# Patient Record
(demographics unavailable — no encounter records)

---

## 2024-10-07 NOTE — HISTORY OF PRESENT ILLNESS
[FreeTextEntry1] : JOI is a 74 year old male here for a follow up [de-identified] : 75 yo male presents for follow up of HTN, HLD, hepatic steatosis. reports feeling well. Denies fever, chills, cp, palpitations, sob, nvcd.

## 2024-10-07 NOTE — ASSESSMENT
[FreeTextEntry1] : HTN: bp mildly elevated, recommend c/w current regimen including hctz 25 mg po daily, losartan 100 mg po daily, will consider additional medication in 3 months, recommend low salt diet, wt loss, exercise, DASH diet HLD: lipid profile 6/24 reviewed, Recommend low fat diet, wt loss, exercise, nutritional counseling provided, c/w atorvastatin 20 mg po daily, repeat in 3 months Hepatic steatosis: LFTs 6/24 reviewed, Recommend low fat diet, wt loss, exercise, and DASH diet, avoid excess alcohol/tylenol, repeat LFTs in 3 months RTC 3 months

## 2024-10-18 NOTE — ASSESSMENT
[FreeTextEntry1] : HTN: bp elevated, has been elevated at home, start amlodipine 5 mg po daily, c/w hctz 25 mg po daily, c/w losartan 100 mg po daily, recommend low salt diet, low int stretching/exercise, dash diet RTC 4 wks

## 2024-10-18 NOTE — HISTORY OF PRESENT ILLNESS
[FreeTextEntry8] : 73 yo male presents for follow up of elevated bp. he reports that he has been getting elevated blood pressures at home. She was checked by RN at home and has been elevated. Denies fever, chills, cp, palpitations, sob, nvcd.

## 2024-11-07 NOTE — ASSESSMENT
[FreeTextEntry1] : HTN: bp reviewed, c/w amlodipine 5 mg po daily, losartan 100 mg po daily, recommend low salt diet, wt loss, exercise, DASH diet HLD: lipid profile 6/24 reviewed, Recommend low fat diet, wt loss, exercise, nutritional counseling provided, f/u lipid panel ordered, c/w atorvastatin 20 mg po daily Hepatic steatosis: Recommend low fat diet, wt loss, exercise, and DASH diet, avoid excess alcohol/tylenol, f/u LFTs ordered RTC 4 wks

## 2024-11-07 NOTE — HISTORY OF PRESENT ILLNESS
[FreeTextEntry1] : Follow up HLD/HTN  [de-identified] : 73 yo male presents for follow up of HTN, hepatic steatosis, HLD. reports feeling well. Denies fever, chills, cp, palpitations, sob, nvcd.

## 2025-02-07 NOTE — ASSESSMENT
[FreeTextEntry1] : HLD: lipid profile 11/24 reviewed, Recommend low fat diet, wt loss, exercise, nutritional counseling provided, f/u lipid panel ordered, c/w atorvastatin 20 mg po daily HTN: bp reviewed, start amlodipine 10 mg po daily, c/w losartan 100 mg po daily, dc hctz, recommend low salt diet, wt loss, exercise, DASH diet Hepatic steatosis: LFTs 11/24 reviewed, Recommend low fat diet, wt loss, exercise, and DASH diet, avoid excess alcohol/tylenol, f/u LFTs ordered RTC 4 wks

## 2025-02-07 NOTE — HISTORY OF PRESENT ILLNESS
[FreeTextEntry1] : Follow up  [de-identified] : 73 yo male presents for follow up of HTN, HLD, hepatic steatosis. reports feeling well. Denies fever, chills, cp, palpitations, sob, nvcd.
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
## 2025-04-28 NOTE — DISCUSSION/SUMMARY
[EKG obtained to assist in diagnosis and management of assessed problem(s)] : EKG obtained to assist in diagnosis and management of assessed problem(s) [FreeTextEntry1] : Pt is a 75 y/o M PMH HTN, HLD.  cardiac cath done 2004 was "normal" CCTA 12/2020 calcium score = 2, LAD <10% CUS 06/2018 min-mild plaque b/l TTE 02/2020 EF 50-55%, mild DD, mild LVH, mild MR Nuclear stress test 02/2020 no clear evidence of myocardial ischemia or scar  HTN:  well controlled c/w losartan 100mg  c/w amlodipine 10mg qd Advised low salt diet, regular exercise, weight loss  HLD: c/w lipitor 20mg qd Advised lifestyle modifications  recent labs reviewed  Pt will return in 12 mnths or sooner as needed but I encouraged communication via phone or portal if necessary.  The described plan was discussed with the pt.  All questions and concerns were addressed to the best of my knowledge.

## 2025-04-28 NOTE — PHYSICAL EXAM
[Well Developed] : well developed [Well Nourished] : well nourished [No Acute Distress] : no acute distress [Normal Venous Pressure] : normal venous pressure [No Carotid Bruit] : no carotid bruit [Normal S1, S2] : normal S1, S2 [No Murmur] : no murmur [No Rub] : no rub [No Gallop] : no gallop [Clear Lung Fields] : clear lung fields [Good Air Entry] : good air entry [No Respiratory Distress] : no respiratory distress  [Soft] : abdomen soft [Non Tender] : non-tender [No Masses/organomegaly] : no masses/organomegaly [Normal Bowel Sounds] : normal bowel sounds [Normal Gait] : normal gait [No Edema] : no edema [No Cyanosis] : no cyanosis [No Clubbing] : no clubbing [No Varicosities] : no varicosities [No Rash] : no rash [No Skin Lesions] : no skin lesions [Moves all extremities] : moves all extremities [No Focal Deficits] : no focal deficits [Normal Speech] : normal speech [Alert and Oriented] : alert and oriented [Normal memory] : normal memory [General Appearance - Well Developed] : well developed [Normal Appearance] : normal appearance [Well Groomed] : well groomed [General Appearance - Well Nourished] : well nourished [No Deformities] : no deformities [General Appearance - In No Acute Distress] : no acute distress [Normal Conjunctiva] : the conjunctiva exhibited no abnormalities [Eyelids - No Xanthelasma] : the eyelids demonstrated no xanthelasmas [Normal Oral Mucosa] : normal oral mucosa [No Oral Pallor] : no oral pallor [No Oral Cyanosis] : no oral cyanosis [Respiration, Rhythm And Depth] : normal respiratory rhythm and effort [Exaggerated Use Of Accessory Muscles For Inspiration] : no accessory muscle use [Auscultation Breath Sounds / Voice Sounds] : lungs were clear to auscultation bilaterally [Heart Rate And Rhythm] : heart rate and rhythm were normal [Heart Sounds] : normal S1 and S2 [Murmurs] : no murmurs present [Arterial Pulses Normal] : the arterial pulses were normal [Edema] : no peripheral edema present [Abdomen Soft] : soft [Abdomen Tenderness] : non-tender [Abdomen Mass (___ Cm)] : no abdominal mass palpated [Abnormal Walk] : normal gait [Gait - Sufficient For Exercise Testing] : the gait was sufficient for exercise testing [Nail Clubbing] : no clubbing of the fingernails [Cyanosis, Localized] : no localized cyanosis [Petechial Hemorrhages (___cm)] : no petechial hemorrhages [] : no ischemic changes [Oriented To Time, Place, And Person] : oriented to person, place, and time [Affect] : the affect was normal [Mood] : the mood was normal [No Anxiety] : not feeling anxious [FreeTextEntry1] : no JVD or bruits

## 2025-06-11 NOTE — ASSESSMENT
[Vaccines Reviewed] : Immunizations reviewed today. Please see immunization details in the vaccine log within the immunization flowsheet.  [FreeTextEntry1] : Annual physical: f/u routine labwork HTN: bp reviewed, c/w current regimen, recommend low salt diet, wt loss, exercise, DASH diet HLD: Recommend low fat diet, wt loss, exercise, nutritional counseling provided, f/u lipid panel ordered, c/w statin therapy Colon polyp: f/u GI for repeat colonoscopy Hepatic steatosis: Recommend low fat diet, wt loss, exercise, and DASH diet., avoid excess alcohol/tylenol, f/u LFTs Hepatic cyst: asymp, will ct monitor Carotid artery plaque: f/u cardiology Hx of BCC: f/u dermatology for skin checks RTC D3 wks

## 2025-06-11 NOTE — HEALTH RISK ASSESSMENT
[Very Good] : ~his/her~  mood as very good [2 - 4 times a month (2 pts)] : 2-4 times a month (2 points) [1 or 2 (0 pts)] : 1 or 2 (0 points) [Never (0 pts)] : Never (0 points) [No] : In the past 12 months have you used drugs other than those required for medical reasons? No [No falls in past year] : Patient reported no falls in the past year [0] : 2) Feeling down, depressed, or hopeless: Not at all (0) [PHQ-2 Negative - No further assessment needed] : PHQ-2 Negative - No further assessment needed [Yes] : takes [Opioids] : opioids [Never] : Never [Patient reported colonoscopy was abnormal] : Patient reported colonoscopy was abnormal [HIV test declined] : HIV test declined [Hepatitis C test declined] : Hepatitis C test declined [Language] : difficulty with language [None] : None [With Family] : lives with family [Retired] : retired [] :  [Sexually Active] : sexually active [Feels Safe at Home] : Feels safe at home [Fully functional (bathing, dressing, toileting, transferring, walking, feeding)] : Fully functional (bathing, dressing, toileting, transferring, walking, feeding) [Fully functional (using the telephone, shopping, preparing meals, housekeeping, doing laundry, using] : Fully functional and needs no help or supervision to perform IADLs (using the telephone, shopping, preparing meals, housekeeping, doing laundry, using transportation, managing medications and managing finances) [Patient/Caregiver not ready to engage] : , patient/caregiver not ready to engage [Audit-CScore] : 2 [de-identified] : regularly, walks [de-identified] : healthy [[id number]] : 0 [Behavior] : denies difficulty with behavior [Learning/Retaining New Information] : denies difficulty learning/retaining new information [Reasoning] : denies difficulty with reasoning [Handling Complex Tasks] : denies difficulty handling complex tasks [Spatial Ability and Orientation] : denies difficulty with spatial ability and orientation [High Risk Behavior] : no high risk behavior [Reports changes in hearing] : Reports no changes in hearing [Reports changes in vision] : Reports no changes in vision [Reports changes in dental health] : Reports no changes in dental health [ColonoscopyDate] : 08/23 [de-identified] : deaf, has sign language interpreters [ColonoscopyComments] : colon polyp [AdvancecareDate] : 06/25

## 2025-06-11 NOTE — HISTORY OF PRESENT ILLNESS
[FreeTextEntry1] : AMBER [de-identified] : 73 yo male presents for annual physical. Reports feeling well. Denies fever, chills, cp, palpitations, sob, nvcd.

## 2025-06-11 NOTE — REASON FOR VISIT
[Annual Wellness Visit] : an annual wellness visit [FreeTextEntry1] : [facility] wellness exam

## 2025-07-16 NOTE — PHYSICAL EXAM
[Alert] : alert [Oriented x 3] : ~L oriented x 3 [Well Nourished] : well nourished [FreeTextEntry3] : The following areas were examined and no significant abnormalities were seen except as noted below:  Type II skin  scalp, face, eyelids, nose, lips, ears, neck, chest, abdomen, back,groin, buttocks, right arm, left arm, right hand, left hand, right  leg, left leg, right foot, left foot  Trunk: Mild to moderate plaque verrucous plaques including lesions on the abdomen  No suspicious lesions seen

## 2025-07-16 NOTE — HISTORY OF PRESENT ILLNESS
[FreeTextEntry1] : Evaluation of growths [de-identified] : Follow-up visit for 74-year-old white male, deaf, last seen by me on July 15, 2024, for evaluation of growths. Particularly concerned about lesions on the abdomen.  Patient has history of basal cell carcinoma on the right upper cheek treated with radiation therapy in 2018.

## 2025-07-16 NOTE — PLAN
[TextEntry] : Reassurance about all skin growths  Return 1 year  Esme Harris served as  ID# [id number]  CMS time of visit: 10 minutes